# Patient Record
Sex: MALE | Race: WHITE | NOT HISPANIC OR LATINO | Employment: FULL TIME | URBAN - METROPOLITAN AREA
[De-identification: names, ages, dates, MRNs, and addresses within clinical notes are randomized per-mention and may not be internally consistent; named-entity substitution may affect disease eponyms.]

---

## 2023-03-02 ENCOUNTER — OFFICE VISIT (OUTPATIENT)
Dept: INTERNAL MEDICINE | Facility: CLINIC | Age: 23
End: 2023-03-02
Payer: COMMERCIAL

## 2023-03-02 VITALS
OXYGEN SATURATION: 98 % | TEMPERATURE: 98 F | DIASTOLIC BLOOD PRESSURE: 92 MMHG | HEIGHT: 74 IN | HEART RATE: 62 BPM | BODY MASS INDEX: 24.77 KG/M2 | WEIGHT: 193 LBS | SYSTOLIC BLOOD PRESSURE: 120 MMHG

## 2023-03-02 DIAGNOSIS — Z13.21 ENCOUNTER FOR VITAMIN DEFICIENCY SCREENING: ICD-10-CM

## 2023-03-02 DIAGNOSIS — R94.31 SHORTENED PR INTERVAL: ICD-10-CM

## 2023-03-02 DIAGNOSIS — R60.9 EDEMA, UNSPECIFIED TYPE: ICD-10-CM

## 2023-03-02 DIAGNOSIS — Z82.49 FAMILY HISTORY OF MI (MYOCARDIAL INFARCTION): Primary | ICD-10-CM

## 2023-03-02 DIAGNOSIS — Z13.220 SCREENING FOR LIPID DISORDERS: ICD-10-CM

## 2023-03-02 DIAGNOSIS — Z13.29 THYROID DISORDER SCREENING: ICD-10-CM

## 2023-03-02 PROCEDURE — 93000 ELECTROCARDIOGRAM COMPLETE: CPT | Performed by: PHYSICIAN ASSISTANT

## 2023-03-02 PROCEDURE — 99204 OFFICE O/P NEW MOD 45 MIN: CPT | Performed by: PHYSICIAN ASSISTANT

## 2023-03-02 NOTE — PROGRESS NOTES
"[facility] Internal Medicine    Andrew Brand  2000   [id number]      Patient Care Team:  Cheyenne Courtney PA-C as PCP - General (Physician Assistant)    Chief Complaint::   Chief Complaint   Patient presents with   • Annual Exam        HPI  Andrew Brand is a 22-year-old male date of birth 2000 who presents today to establish care. He works in [employer], moving to [place] in December. He is significant other to Annabelle Stevens, also a patient.  Originally from [place], [place]. Currently works as a . Works full time, based here temporarily.  Travels 2 weeks out of the month.    Mother-healthy  Father-MI, HTN.    PGF  young of MI ~ 20s-30s?  Father had first MI at 28 years old.  He is now 58yo and has multiple MIs.  2 older brothers.  One brother, 27yo, recently hospitalized for \"cardiac issues.\"    Right elbow surgery 8th grade.    He exercises regularly, no chest pain, He denies heart palpitations.  Played football and baseball in high school.  Denies any episodes of syncope or dizziness.    Reports episodes of edema in ankles and feet.  Worse with drinking alcohol, flying.      Patient Active Problem List   Diagnosis   • Shortened OR interval   • Family history of MI (myocardial infarction)   • Edema   • Screening for lipid disorders   • Encounter for vitamin deficiency screening   • Thyroid disorder screening        History reviewed. No pertinent past medical history.    History reviewed. No pertinent surgical history.    History reviewed. No pertinent family history.    Social History     Socioeconomic History   • Marital status: Single       No Known Allergies    Review of Systems   Constitutional: Negative for activity change, appetite change, diaphoresis, fatigue, unexpected weight gain and unexpected weight loss.   HENT: Negative for hearing loss.    Eyes: Negative for visual disturbance.   Respiratory: Negative for chest tightness and shortness of " "breath.    Cardiovascular: Positive for leg swelling. Negative for chest pain and palpitations.   Gastrointestinal: Negative for abdominal pain, blood in stool, GERD and indigestion.   Endocrine: Negative for cold intolerance and heat intolerance.   Genitourinary: Negative for dysuria and hematuria.   Musculoskeletal: Negative for arthralgias and myalgias.   Skin: Negative for skin lesions.   Neurological: Negative for tremors, seizures, syncope, speech difficulty, weakness, headache, memory problem and confusion.   Hematological: Does not bruise/bleed easily.   Psychiatric/Behavioral: Negative for sleep disturbance and depressed mood. The patient is not nervous/anxious.         Vital Signs  Vitals:    03/02/23 1359   BP: 120/92   BP Location: Right arm   Patient Position: Sitting   Cuff Size: Large Adult   Pulse: 62   Temp: 98 °F (36.7 °C)   TempSrc: Temporal   SpO2: 98%   Weight: 87.5 kg (193 lb)   Height: 188 cm (74\")   PainSc: 0-No pain     Body mass index is 24.78 kg/m².  BMI is within normal parameters. No other follow-up for BMI required.     Advance Care Planning   ACP discussion was held with the patient during this visit. Patient does not have an advance directive, information provided.     No current outpatient medications on file.    Physical Exam  Vitals reviewed.   Constitutional:       Appearance: Normal appearance. He is well-developed.   HENT:      Head: Normocephalic and atraumatic.      Right Ear: Hearing, tympanic membrane, ear canal and external ear normal. There is impacted cerumen.      Left Ear: Hearing, tympanic membrane, ear canal and external ear normal. There is impacted cerumen.      Nose: Nose normal.      Mouth/Throat:      Pharynx: Uvula midline.   Eyes:      General: Lids are normal.      Conjunctiva/sclera: Conjunctivae normal.      Pupils: Pupils are equal, round, and reactive to light.   Cardiovascular:      Rate and Rhythm: Normal rate and regular rhythm.      Heart sounds: " Normal heart sounds.   Pulmonary:      Effort: Pulmonary effort is normal.      Breath sounds: Normal breath sounds.   Abdominal:      General: Bowel sounds are normal.      Palpations: Abdomen is soft.   Musculoskeletal:         General: Normal range of motion.      Cervical back: Full passive range of motion without pain, normal range of motion and neck supple.   Skin:     General: Skin is warm and dry.   Neurological:      Mental Status: He is alert and oriented to person, place, and time.      Deep Tendon Reflexes: Reflexes are normal and symmetric.   Psychiatric:         Speech: Speech normal.         Behavior: Behavior normal.         Thought Content: Thought content normal.         Judgment: Judgment normal.          ACE III MINI            Results Review:    No results found for this or any previous visit (from the past 672 hour(s)).    ECG 12 Lead    Date/Time: 3/2/2023 2:49 PM  Performed by: Cheyenne Courtney PA-C  Authorized by: Cheyenne Courtney PA-C   Previous ECG: no previous ECG available  Rhythm: sinus bradycardia and sinus arrhythmia  BPM: 56  Comments: Sinus bradycardia with short OH interval            Medication Review: Medications reviewed and noted    Social History     Socioeconomic History   • Marital status: Single        Assessment/Plan:    Diagnoses and all orders for this visit:    1. Family history of MI (myocardial infarction) (Primary)  -     ECG 12 Lead    2. Edema, unspecified type  -     CBC & Differential; Future  -     Comprehensive Metabolic Panel; Future    3. Screening for lipid disorders  -     Lipid Panel; Future    4. Encounter for vitamin deficiency screening  -     Vitamin B12; Future  -     Vitamin D,25-Hydroxy; Future    5. Thyroid disorder screening  -     TSH; Future  -     T4, Free; Future  -     T3, Free; Future    6. Shortened OH interval  Overview:  Sinus bradycardia with short OH interval    Significant family cardiac history with screening EKG sinus  bradycardia with short ME interval.  Discussed with patient.  He is in and out of the state due to work related travel.  I have advised him to have fasting labs performed.  We will check cholesterol thyroid.  Also will send referral to heart valve clinic for full evaluation.  Stay well-hydrated, avoid alcohol.      Plan of care reviewed with patient at the conclusion of today's visit. Education was provided regarding diagnosis, management, and any prescribed or recommended OTC medications.Patient verbalizes understanding of and agreement with management plan.         I spent 25 minutes caring for Andrew on this date of service. This time includes time spent by me in the following activities:preparing for the visit, reviewing tests, obtaining and/or reviewing a separately obtained history, performing a medically appropriate examination and/or evaluation , counseling and educating the patient/family/caregiver, ordering medications, tests, or procedures, referring and communicating with other health care professionals  and documenting information in the medical record    Cheyenne Courtney PA-C      Note: Part of this note may be an electronic transcription/translation of spoken language to printed text using the Dragon Dictation system.